# Patient Record
Sex: MALE | Race: WHITE
[De-identification: names, ages, dates, MRNs, and addresses within clinical notes are randomized per-mention and may not be internally consistent; named-entity substitution may affect disease eponyms.]

---
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
## 2020-02-16 ENCOUNTER — HOSPITAL ENCOUNTER (EMERGENCY)
Dept: HOSPITAL 25 - UCCORT | Age: 20
Discharge: HOME | End: 2020-02-16
Payer: COMMERCIAL

## 2020-02-16 VITALS — DIASTOLIC BLOOD PRESSURE: 77 MMHG | SYSTOLIC BLOOD PRESSURE: 127 MMHG

## 2020-02-16 DIAGNOSIS — L03.012: Primary | ICD-10-CM

## 2020-02-16 PROCEDURE — 99212 OFFICE O/P EST SF 10 MIN: CPT

## 2020-02-16 PROCEDURE — G0463 HOSPITAL OUTPT CLINIC VISIT: HCPCS

## 2020-02-16 NOTE — UC
Hand/Wrist HPI





- HPI Summary


HPI Summary: 


left index finger sore with erythema and swelling around nail for 2 days








- History Of Current Complaint


Chief Complaint: UCSkin


Stated Complaint: LEFT INDEX FINGER RED,SORE


Time Seen by Provider: 02/16/20 21:13


Hx Obtained From: Patient


Pregnant?: No


Onset/Duration: Sudden Onset, Lasting Days - 2


Pain Intensity: 0


Pain Scale Used: 0-10 Numeric


Character Of Pain: Aching


Alleviating Factor(s): Nothing


Associated Signs And Symptoms: Positive: Swelling, Redness


Related History: Dominant Hand Right





- Allergies/Home Medications


Allergies/Adverse Reactions: 


 Allergies











Allergy/AdvReac Type Severity Reaction Status Date / Time


 


No Known Allergies Allergy   Verified 02/16/20 20:38











Home Medications: 


 Home Medications





Fluticasone NASAL SPRAY 50MCG* [Flonase NASAL SPRAY 50MCG*] 1 spray INH DAILY 03 /04/19 [History Confirmed 02/16/20]


Cephalexin CAP* [Keflex CAP*] 500 mg PO QID #20 cap 02/16/20 [Rx]











PMH/Surg Hx/FS Hx/Imm Hx


Previously Healthy: No





- Surgical History


Surgical History: None





- Family History


Known Family History: Positive: None





- Social History


Occupation: Student


Lives: Dormitory/Roommates


Alcohol Use: Weekly


Substance Use Type: None


Smoking Status (MU): Never Smoked Tobacco





Review of Systems


All Other Systems Reviewed And Are Negative: Yes


Constitutional: Positive: Negative


Skin: Positive: Other - swollen tender area around left index finger





Physical Exam


Triage Information Reviewed: Yes


Appearance: Well-Appearing, No Pain Distress, Well-Nourished


Vital Signs: 


 Initial Vital Signs











Temp  98.7 F   02/16/20 20:38


 


Pulse  77   02/16/20 20:38


 


Resp  16   02/16/20 20:38


 


BP  127/77   02/16/20 20:38


 


Pulse Ox  99   02/16/20 20:38











Vital Signs Reviewed: Yes


Eye Exam: Normal


Eyes: Positive: Conjunctiva Clear


ENT Exam: Normal


ENT: Positive: Normal ENT inspection, Hearing grossly normal, Pharynx normal.  

Negative: Nasal congestion, Trismus, Muffled voice, Hoarse voice, Dental 

tenderness, Sinus tenderness


Dental Exam: Normal


Neck exam: Normal


Neck: Positive: Supple, Nontender, No Lymphadenopathy


Respiratory Exam: Normal


Respiratory: Positive: Chest non-tender, Lungs clear, Normal breath sounds, No 

respiratory distress, No accessory muscle use


Cardiovascular Exam: Normal


Cardiovascular: Positive: RRR, No Murmur, Pulses Normal, Brisk Capillary Refill


Musculoskeletal Exam: Normal


Musculoskeletal: Positive: Strength Intact, ROM Intact, No Edema


Neurological Exam: Normal


Neurological: Positive: Alert, Muscle Tone Normal


Psychological Exam: Normal


Skin Exam: Normal





Hand/Wrist Course/Dx





- Course


Course Of Treatment: 


mild soap and water wash , warm soak keflex follow with pcp prn if needed








- Differential Dx/Diagnosis


Provider Diagnosis: 


 Paronychia of left index finger








Discharge ED





- Sign-Out/Discharge


Documenting (check all that apply): Patient Departure


All imaging exams completed and their final reports reviewed: No Studies





- Discharge Plan


Condition: Stable


Disposition: HOME


Prescriptions: 


Cephalexin CAP* [Keflex CAP*] 500 mg PO QID #20 cap


Patient Education Materials:  Paronychia (ED), Warm Compress or Soak (ED)


Referrals: 


[facility] [Outside] - If Needed





- Billing Disposition and Condition


Condition: STABLE


Disposition: Home





- Attestation Statements


Provider Attestation: 





This patient was not seen by me.


I was available for consult


Chart reviewed





SHY